# Patient Record
Sex: MALE | Race: WHITE | NOT HISPANIC OR LATINO
[De-identification: names, ages, dates, MRNs, and addresses within clinical notes are randomized per-mention and may not be internally consistent; named-entity substitution may affect disease eponyms.]

---

## 2024-10-05 ENCOUNTER — APPOINTMENT (OUTPATIENT)
Dept: ORTHOPEDIC SURGERY | Facility: CLINIC | Age: 24
End: 2024-10-05
Payer: COMMERCIAL

## 2024-10-05 DIAGNOSIS — S83.8X2A SPRAIN OF OTHER SPECIFIED PARTS OF LEFT KNEE, INITIAL ENCOUNTER: ICD-10-CM

## 2024-10-05 PROBLEM — Z00.00 ENCOUNTER FOR PREVENTIVE HEALTH EXAMINATION: Status: ACTIVE | Noted: 2024-10-05

## 2024-10-05 PROCEDURE — 73562 X-RAY EXAM OF KNEE 3: CPT | Mod: LT

## 2024-10-05 PROCEDURE — 99203 OFFICE O/P NEW LOW 30 MIN: CPT

## 2024-10-05 RX ORDER — NABUMETONE 750 MG/1
750 TABLET, FILM COATED ORAL TWICE DAILY
Qty: 60 | Refills: 0 | Status: ACTIVE | COMMUNITY
Start: 2024-10-05 | End: 1900-01-01

## 2024-10-05 NOTE — DISCUSSION/SUMMARY
[de-identified] : Chief complaint: Left knee pain  HPI: Patient is a 23-year-old male who presents the office today for the evaluation of left knee pain which manifested on Tuesday, 10/1/2024.  Patient reports that he was at work.  He reports that while working he felt as though he "tweaked" the left knee.  Since that time he has been having pain to the left knee which he localizes primarily to the posterior aspect of the knee.  He has minimal pain at rest.  The pain is primarily present with weightbearing and ambulation.  He denies any buckling or locking of the knee.  Denies taking any medication for the relief of his discomfort.  Denies any previous injury or surgery to the left knee.  ROS: Positive for left knee pain  Physical examination of the left knee shows: No erythema  No ecchymosis  Tenderness to palpation over posterior aspect of the knee No appreciable effusion Able to perform active straight leg raise Knee flexion from 0-120 degrees Light touch is intact throughout Nonantalgic gait Negative Grant's Negative anterior Drawer No appreciable instability with varus / valgus stress testing Calves are soft and nontender  Three-view x-rays of the left knee performed in the office today show no obvious acute displaced fracture, subluxation, or dislocation  Assessment/plan: Sprain of the left knee, discussed with the patient that sprains typically take 4-6 weeks on average to heal/resolve, discussed treatment options with the patient, A prescription for nabumetone 750 mg as needed twice daily with food was sent to the patient's pharmacy, confirmed no contraindications to NSAIDS. Patient denies being on a blood thinner. Denies history of GIB or GI ulcer.  Discussed in detail with the patient that they cannot take over-the-counter NSAIDs including but not limited to ibuprofen, Advil, Aleve, or Motrin while taking this medication.  They can continue to take over-the-counter Tylenol.  Discussed my recommendation for formal physical therapy for the left knee however the patient currently deferred  Patient can apply ice or heat to the left knee on an as-needed basis with center precautions  He can apply an Ace wrap versus neoprene knee sleeve to the left knee for comfort and support while active, I recommend that this be removed while resting, sleeping, and showering/hygiene  Patient will be provided with a 6-week follow-up with me for repeat evaluation, patient verbalized understanding of all findings in the office today, agrees to follow-up as directed

## 2024-11-21 ENCOUNTER — APPOINTMENT (OUTPATIENT)
Dept: ORTHOPEDIC SURGERY | Facility: CLINIC | Age: 24
End: 2024-11-21